# Patient Record
(demographics unavailable — no encounter records)

---

## 2025-06-27 NOTE — ASSESSMENT
[FreeTextEntry1] : Assessment Suspected Obstructive Sleep Apnea (JACKY): High likelihood based on STOP-BANG score of 6, witnessed apneas, loud snoring, and daytime sleepiness ([place] score 12).  Plan Diagnostic Testing: WatchPAT study ordered to confirm JACKY diagnosis and severity. Follow-up in 4 weeks to review WatchPAT results and initiate treatment. JACKY Management: Emphasized CPAP as first-line therapy for JACKY, addressing benefits for sleepiness, headaches, and driving safety. Discussed alternative options (oral appliance, Inspire therapy, Bongo Rx) if CPAP not tolerated post-trial.  Encouraged consistent sleep schedule and bedroom environment optimization.

## 2025-06-27 NOTE — PHYSICAL EXAM
[Enlarged Base of the Tongue] : enlargement of the base of the tongue [III] : III [Heart Sounds] : normal S1 and S2 [Murmurs] : no murmurs [] : no respiratory distress [Auscultation Breath Sounds / Voice Sounds] : lungs were clear to auscultation bilaterally [No Focal Deficits] : no focal deficits [Oriented To Time, Place, And Person] : oriented to person, place, and time [Memory Recent] : recent memory was not impaired

## 2025-06-27 NOTE — HISTORY OF PRESENT ILLNESS
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  Patient is sleepy with [place] sleepiness score of 12.  Patient has very loud snoring which disturbs his wife, also has witnessed apneas.  Patient's bedtime is around 11 PM wakes up in the morning around 7 AM.   He feels tired when he wakes up.  Patient drinks 2 cups of coffee during the daytime. Patient has frequent headaches about couple times a week, no history of nocturia. He is sleepy while driving sometimes. STOPBANG score - 6 neck size - 17.5 inches patients father has sleep apnea.

## 2025-07-28 NOTE — PHYSICAL EXAM
[General Appearance - Well Developed] : well developed [General Appearance - Well Nourished] : well nourished [Oriented To Time, Place, And Person] : oriented to person, place, and time [Impaired Insight] : insight and judgment were intact [TextEntry] :  PE limited as today's visit was a video visit.

## 2025-07-28 NOTE — HISTORY OF PRESENT ILLNESS
[Home] : at home, [unfilled] , at the time of the visit. [Medical Office: ([facility])___] : at the medical office located in  [Telehealth (audio & video)] : This visit was provided via telehealth using real-time 2-way audio visual technology. 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  Patient is sleepy with [place] sleepiness score of 12.  Patient has very loud snoring which disturbs his wife, also has witnessed apneas.  Patient's bedtime is around 11 PM wakes up in the morning around 7 AM.   He feels tired when he wakes up.  Patient drinks 2 cups of coffee during the daytime. Patient has frequent headaches about couple times a week, no history of nocturia. He is sleepy while driving sometimes. STOPBANG score - 6 neck size - 17.5 inches patients father has sleep apnea.  7/28/25: Discussed with pt results of hst completed on 7/17/25 which revealed moderate sleep apnea with an AHI of 17.5 events/hr, ondina 85%.  Discussed potential health consequences of untreated sleep apnea including but not limited to HTN, weight gain, fatigue, increased risk of MI, CVA, arrythmias, heart failure and CAD. Pt agreeable to cpap therapy.  DME: [facility]